# Patient Record
Sex: MALE | Race: WHITE
[De-identification: names, ages, dates, MRNs, and addresses within clinical notes are randomized per-mention and may not be internally consistent; named-entity substitution may affect disease eponyms.]

---

## 2018-02-19 ENCOUNTER — HOSPITAL ENCOUNTER (OUTPATIENT)
Dept: HOSPITAL 92 - RAD-FRANK | Age: 73
Discharge: HOME | End: 2018-02-19
Attending: NURSE PRACTITIONER
Payer: MEDICARE

## 2018-02-19 DIAGNOSIS — M25.552: Primary | ICD-10-CM

## 2018-02-19 NOTE — RAD
TWO VIEWS LEFT HIP:

 

HISTORY: 

Fall.  Pain.

 

COMPARISON: 

7/14/15.

 

FINDINGS: 

There are 2 internal fixation screws that traverse the left hip, without evidence of perihardware shannan
ency.  An acute fracture is not appreciated.

 

Stable heterotopic bone formation.  Stable vascular calcifications.

 

Visualized left bony pelvis and right bony pelvis are unremarkable.

 

IMPRESSION: 

No fracture.

 

POS: [facility]

## 2018-03-08 ENCOUNTER — HOSPITAL ENCOUNTER (OUTPATIENT)
Dept: HOSPITAL 92 - RAD-FRANK | Age: 73
Discharge: HOME | End: 2018-03-08
Attending: NURSE PRACTITIONER
Payer: MEDICARE

## 2018-03-08 DIAGNOSIS — M25.551: Primary | ICD-10-CM

## 2018-03-08 NOTE — RAD
RIGHT HIP 2 VIEWS:

 

HISTORY: 

Fall with injury and pain to right hip.

 

FINDINGS: 

Femoral head contour is normally maintained.  No evidence of fracture identified.

 

IMPRESSION: 

No acute fracture identified.

 

POS: [facility]

## 2018-05-18 ENCOUNTER — HOSPITAL ENCOUNTER (OUTPATIENT)
Dept: HOSPITAL 92 - RAD-FRANK | Age: 73
Discharge: HOME | End: 2018-05-18
Attending: NURSE PRACTITIONER
Payer: MEDICARE

## 2018-05-18 DIAGNOSIS — J92.9: Primary | ICD-10-CM

## 2018-05-18 DIAGNOSIS — J98.4: ICD-10-CM

## 2018-05-18 PROCEDURE — 71046 X-RAY EXAM CHEST 2 VIEWS: CPT

## 2022-03-25 NOTE — RAD
RADIOGRAPH CHEST 2 VIEWS:

 

Date: 5/18/18.

Time: 9:42 a.m.

 

HISTORY: 

A 73-year-old male with cough.

 

COMPARISON: 

4/20/16.

 

FINDINGS:

Focal posterior pleural thickening demonstrated on the lateral view, at the mid-lower lung zone.  Unc
ertain whether this is on the right or left.  This is unchanged since the previous study.  Old compre
ssion fracture of one of the mid thoracic vertebral bodies is again noted.  No pulmonary edema or con
solidation.  Thickening of the right lateral pleural stripe, unchanged.  Architectural distortion of 
the right mid lung field on frontal view, with elevation of medial aspect of right hemidiaphragm 
nically, with air-filled loop of colonic interposition immediately inferior to it.  The heart is shif
shelley to the left.  Probably no cardiomegaly.  No pulmonary vascular engorgement.  No pneumothorax.  No
 interval change overall.

 

IMPRESSION:

1.  Chronic Right pleural thickening.

 

2.  Right middle lobe pulmonary scar and chronic volume loss.

 

3.  No acute findings.

 

4.  No interval change overall since 4/20/16.

 

 

JN []

 

POS: [facility]
no